# Patient Record
Sex: MALE | Race: WHITE | Employment: UNEMPLOYED | URBAN - METROPOLITAN AREA
[De-identification: names, ages, dates, MRNs, and addresses within clinical notes are randomized per-mention and may not be internally consistent; named-entity substitution may affect disease eponyms.]

---

## 2023-07-16 ENCOUNTER — HOSPITAL ENCOUNTER (EMERGENCY)
Age: 33
Discharge: HOME | End: 2023-07-16

## 2023-07-16 VITALS
BODY MASS INDEX: 25.9 KG/M2 | OXYGEN SATURATION: 98 % | WEIGHT: 185 LBS | RESPIRATION RATE: 28 BRPM | TEMPERATURE: 98.2 F | HEART RATE: 98 BPM | DIASTOLIC BLOOD PRESSURE: 81 MMHG | SYSTOLIC BLOOD PRESSURE: 163 MMHG | HEIGHT: 71 IN

## 2023-07-16 DIAGNOSIS — R00.2 PALPITATIONS: ICD-10-CM

## 2023-07-16 DIAGNOSIS — F10.920 ACUTE ALCOHOLIC INTOXICATION WITHOUT COMPLICATION (HCC): Primary | ICD-10-CM

## 2023-07-16 LAB
ALBUMIN SERPL-MCNC: 5 G/DL (ref 3.5–5.2)
ALP SERPL-CCNC: 97 U/L (ref 40–129)
ALT SERPL-CCNC: 143 U/L (ref 0–40)
AMPHET UR QL SCN: NEGATIVE
ANION GAP SERPL CALCULATED.3IONS-SCNC: 17 MMOL/L (ref 7–16)
APAP SERPL-MCNC: <5 UG/ML (ref 10–30)
AST SERPL-CCNC: 127 U/L (ref 0–39)
BARBITURATES UR QL SCN: NEGATIVE
BASOPHILS # BLD: 0.08 K/UL (ref 0–0.2)
BASOPHILS NFR BLD: 1 % (ref 0–2)
BENZODIAZ UR QL: NEGATIVE
BILIRUB SERPL-MCNC: 0.4 MG/DL (ref 0–1.2)
BILIRUB UR QL STRIP: NEGATIVE
BUN SERPL-MCNC: 8 MG/DL (ref 6–20)
CALCIUM SERPL-MCNC: 9.5 MG/DL (ref 8.6–10.2)
CANNABINOIDS UR QL SCN: POSITIVE
CHLORIDE SERPL-SCNC: 98 MMOL/L (ref 98–107)
CLARITY UR: CLEAR
CO2 SERPL-SCNC: 25 MMOL/L (ref 22–29)
COCAINE UR QL SCN: POSITIVE
COLOR UR: YELLOW
CREAT SERPL-MCNC: 1.1 MG/DL (ref 0.7–1.2)
EOSINOPHIL # BLD: 0 K/UL (ref 0.05–0.5)
EOSINOPHILS RELATIVE PERCENT: 0 % (ref 0–6)
ERYTHROCYTE [DISTWIDTH] IN BLOOD BY AUTOMATED COUNT: 12.8 % (ref 11.5–15)
ETHANOLAMINE SERPL-MCNC: 273 MG/DL
FENTANYL UR QL: NEGATIVE
GFR SERPL CREATININE-BSD FRML MDRD: >60 ML/MIN/1.73M2
GLUCOSE SERPL-MCNC: 122 MG/DL (ref 74–99)
GLUCOSE UR STRIP-MCNC: NEGATIVE MG/DL
HCT VFR BLD AUTO: 48.3 % (ref 37–54)
HGB BLD-MCNC: 17.4 G/DL (ref 12.5–16.5)
HGB UR QL STRIP.AUTO: NEGATIVE
IMM GRANULOCYTES # BLD AUTO: <0.03 K/UL (ref 0–0.58)
IMM GRANULOCYTES NFR BLD: 0 % (ref 0–5)
KETONES UR STRIP-MCNC: NEGATIVE MG/DL
LEUKOCYTE ESTERASE UR QL STRIP: NEGATIVE
LYMPHOCYTES # BLD: 59 % (ref 20–42)
LYMPHOCYTES NFR BLD: 3.85 K/UL (ref 1.5–4)
MAGNESIUM SERPL-MCNC: 2 MG/DL (ref 1.6–2.6)
MCH RBC QN AUTO: 32 PG (ref 26–35)
MCHC RBC AUTO-ENTMCNC: 36 G/DL (ref 32–34.5)
MCV RBC AUTO: 89 FL (ref 80–99.9)
METHADONE UR QL: NEGATIVE
MONOCYTES NFR BLD: 0.45 K/UL (ref 0.1–0.95)
MONOCYTES NFR BLD: 7 % (ref 2–12)
NEUTROPHILS NFR BLD: 33 % (ref 43–80)
NEUTS SEG NFR BLD: 2.18 K/UL (ref 1.8–7.3)
NITRITE UR QL STRIP: NEGATIVE
OPIATES UR QL SCN: NEGATIVE
OXYCODONE UR QL SCN: NEGATIVE
PCP UR QL SCN: NEGATIVE
PH UR STRIP: 7.5 [PH] (ref 5–9)
PHOSPHATE SERPL-MCNC: 1.5 MG/DL (ref 2.5–4.5)
PLATELET # BLD AUTO: 287 K/UL (ref 130–450)
PMV BLD AUTO: 8.8 FL (ref 7–12)
POTASSIUM SERPL-SCNC: 3.8 MMOL/L (ref 3.5–5)
PROT SERPL-MCNC: 8.2 G/DL (ref 6.4–8.3)
PROT UR STRIP-MCNC: NEGATIVE MG/DL
RBC # BLD AUTO: 5.43 M/UL (ref 3.8–5.8)
RBC #/AREA URNS HPF: NORMAL /HPF
SALICYLATES SERPL-MCNC: 1.1 MG/DL (ref 0–30)
SODIUM SERPL-SCNC: 140 MMOL/L (ref 132–146)
SP GR UR STRIP: 1.01 (ref 1–1.03)
TEST INFORMATION: ABNORMAL
TOXIC TRICYCLIC SC,BLOOD: NEGATIVE
TROPONIN I SERPL HS-MCNC: <6 NG/L (ref 0–11)
UROBILINOGEN UR STRIP-ACNC: 0.2 EU/DL (ref 0–1)
WBC #/AREA URNS HPF: NORMAL /HPF
WBC OTHER # BLD: 6.6 K/UL (ref 4.5–11.5)

## 2023-07-16 PROCEDURE — 93005 ELECTROCARDIOGRAM TRACING: CPT | Performed by: NURSE PRACTITIONER

## 2023-07-16 PROCEDURE — 85027 COMPLETE CBC AUTOMATED: CPT

## 2023-07-16 PROCEDURE — 80179 DRUG ASSAY SALICYLATE: CPT

## 2023-07-16 PROCEDURE — 80143 DRUG ASSAY ACETAMINOPHEN: CPT

## 2023-07-16 PROCEDURE — 80053 COMPREHEN METABOLIC PANEL: CPT

## 2023-07-16 PROCEDURE — G0480 DRUG TEST DEF 1-7 CLASSES: HCPCS

## 2023-07-16 PROCEDURE — 6360000002 HC RX W HCPCS: Performed by: NURSE PRACTITIONER

## 2023-07-16 PROCEDURE — 2580000003 HC RX 258: Performed by: NURSE PRACTITIONER

## 2023-07-16 PROCEDURE — 81001 URINALYSIS AUTO W/SCOPE: CPT

## 2023-07-16 PROCEDURE — 80307 DRUG TEST PRSMV CHEM ANLYZR: CPT

## 2023-07-16 PROCEDURE — 83735 ASSAY OF MAGNESIUM: CPT

## 2023-07-16 PROCEDURE — 84484 ASSAY OF TROPONIN QUANT: CPT

## 2023-07-16 PROCEDURE — 84100 ASSAY OF PHOSPHORUS: CPT

## 2023-07-16 PROCEDURE — 96374 THER/PROPH/DIAG INJ IV PUSH: CPT

## 2023-07-16 PROCEDURE — 99284 EMERGENCY DEPT VISIT MOD MDM: CPT

## 2023-07-16 PROCEDURE — 6370000000 HC RX 637 (ALT 250 FOR IP): Performed by: NURSE PRACTITIONER

## 2023-07-16 RX ORDER — LORAZEPAM 1 MG/1
3 TABLET ORAL
Status: DISCONTINUED | OUTPATIENT
Start: 2023-07-16 | End: 2023-07-16 | Stop reason: HOSPADM

## 2023-07-16 RX ORDER — LANOLIN ALCOHOL/MO/W.PET/CERES
100 CREAM (GRAM) TOPICAL DAILY
Status: DISCONTINUED | OUTPATIENT
Start: 2023-07-16 | End: 2023-07-16 | Stop reason: HOSPADM

## 2023-07-16 RX ORDER — MULTIVITAMIN WITH IRON
1 TABLET ORAL DAILY
Status: DISCONTINUED | OUTPATIENT
Start: 2023-07-16 | End: 2023-07-16 | Stop reason: HOSPADM

## 2023-07-16 RX ORDER — LORAZEPAM 2 MG/ML
1 INJECTION INTRAMUSCULAR
Status: DISCONTINUED | OUTPATIENT
Start: 2023-07-16 | End: 2023-07-16 | Stop reason: HOSPADM

## 2023-07-16 RX ORDER — LORAZEPAM 2 MG/ML
2 INJECTION INTRAMUSCULAR
Status: DISCONTINUED | OUTPATIENT
Start: 2023-07-16 | End: 2023-07-16 | Stop reason: HOSPADM

## 2023-07-16 RX ORDER — LORAZEPAM 2 MG/ML
4 INJECTION INTRAMUSCULAR
Status: DISCONTINUED | OUTPATIENT
Start: 2023-07-16 | End: 2023-07-16 | Stop reason: HOSPADM

## 2023-07-16 RX ORDER — 0.9 % SODIUM CHLORIDE 0.9 %
1000 INTRAVENOUS SOLUTION INTRAVENOUS ONCE
Status: COMPLETED | OUTPATIENT
Start: 2023-07-16 | End: 2023-07-16

## 2023-07-16 RX ORDER — LORAZEPAM 1 MG/1
4 TABLET ORAL
Status: DISCONTINUED | OUTPATIENT
Start: 2023-07-16 | End: 2023-07-16 | Stop reason: HOSPADM

## 2023-07-16 RX ORDER — LORAZEPAM 2 MG/ML
3 INJECTION INTRAMUSCULAR
Status: DISCONTINUED | OUTPATIENT
Start: 2023-07-16 | End: 2023-07-16 | Stop reason: HOSPADM

## 2023-07-16 RX ORDER — THIAMINE MONONITRATE (VIT B1) 100 MG
100 TABLET ORAL DAILY
Qty: 30 TABLET | Refills: 0 | Status: SHIPPED | OUTPATIENT
Start: 2023-07-16

## 2023-07-16 RX ORDER — SODIUM CHLORIDE 0.9 % (FLUSH) 0.9 %
5-40 SYRINGE (ML) INJECTION PRN
Status: DISCONTINUED | OUTPATIENT
Start: 2023-07-16 | End: 2023-07-16 | Stop reason: HOSPADM

## 2023-07-16 RX ORDER — SODIUM CHLORIDE 0.9 % (FLUSH) 0.9 %
5-40 SYRINGE (ML) INJECTION EVERY 12 HOURS SCHEDULED
Status: DISCONTINUED | OUTPATIENT
Start: 2023-07-16 | End: 2023-07-16 | Stop reason: HOSPADM

## 2023-07-16 RX ORDER — LORAZEPAM 1 MG/1
1 TABLET ORAL
Status: DISCONTINUED | OUTPATIENT
Start: 2023-07-16 | End: 2023-07-16 | Stop reason: HOSPADM

## 2023-07-16 RX ORDER — LORAZEPAM 2 MG/ML
1 INJECTION INTRAMUSCULAR ONCE
Status: COMPLETED | OUTPATIENT
Start: 2023-07-16 | End: 2023-07-16

## 2023-07-16 RX ORDER — M-VIT,TX,IRON,MINS/CALC/FOLIC 27MG-0.4MG
1 TABLET ORAL DAILY
Qty: 30 TABLET | Refills: 0 | Status: SHIPPED | OUTPATIENT
Start: 2023-07-16

## 2023-07-16 RX ORDER — SODIUM CHLORIDE 9 MG/ML
INJECTION, SOLUTION INTRAVENOUS PRN
Status: DISCONTINUED | OUTPATIENT
Start: 2023-07-16 | End: 2023-07-16 | Stop reason: HOSPADM

## 2023-07-16 RX ORDER — LORAZEPAM 1 MG/1
2 TABLET ORAL
Status: DISCONTINUED | OUTPATIENT
Start: 2023-07-16 | End: 2023-07-16 | Stop reason: HOSPADM

## 2023-07-16 RX ORDER — FOLIC ACID 1 MG/1
1 TABLET ORAL DAILY
Qty: 30 TABLET | Refills: 0 | Status: SHIPPED | OUTPATIENT
Start: 2023-07-16

## 2023-07-16 RX ADMIN — LORAZEPAM 1 MG: 2 INJECTION INTRAMUSCULAR; INTRAVENOUS at 18:12

## 2023-07-16 RX ADMIN — Medication 100 MG: at 18:16

## 2023-07-16 RX ADMIN — Medication 1 TABLET: at 18:12

## 2023-07-16 RX ADMIN — SODIUM CHLORIDE 1000 ML: 9 INJECTION, SOLUTION INTRAVENOUS at 17:54

## 2023-07-16 ASSESSMENT — LIFESTYLE VARIABLES
HOW OFTEN DO YOU HAVE A DRINK CONTAINING ALCOHOL: MONTHLY OR LESS
HOW MANY STANDARD DRINKS CONTAINING ALCOHOL DO YOU HAVE ON A TYPICAL DAY: 3 OR 4

## 2023-07-16 ASSESSMENT — PAIN - FUNCTIONAL ASSESSMENT
PAIN_FUNCTIONAL_ASSESSMENT: NONE - DENIES PAIN
PAIN_FUNCTIONAL_ASSESSMENT: NONE - DENIES PAIN

## 2023-07-17 LAB
EKG ATRIAL RATE: 109 BPM
EKG P AXIS: 66 DEGREES
EKG P-R INTERVAL: 144 MS
EKG Q-T INTERVAL: 336 MS
EKG QRS DURATION: 110 MS
EKG QTC CALCULATION (BAZETT): 452 MS
EKG R AXIS: 72 DEGREES
EKG T AXIS: 57 DEGREES
EKG VENTRICULAR RATE: 109 BPM

## 2023-07-17 PROCEDURE — 93010 ELECTROCARDIOGRAM REPORT: CPT | Performed by: INTERNAL MEDICINE

## 2023-07-17 NOTE — ED NOTES
Pt given ativan and is sleeping peacefully with no s/s of any complications     Margaux Bynum, ROMY  07/16/23 2039

## 2023-07-17 NOTE — DISCHARGE INSTRUCTIONS
Your labs were all basically normal, your phosphorus was a little bit low likely due to the alcohol use. If you do choose to participate in rehab please contact with local rehab facilities.

## 2023-07-17 NOTE — ED NOTES
Pt given discharge summary for acute alcoholic intoxication w/o complication and palpitations. Pt aware of his prescription for folic acid, thiamine, and multivitamin awaiting at his assigned pharmacy.   Pt encouraged to call rehab sources for recovery and follow up with his pcp     Dionte Talavera RN  07/16/23 2048